# Patient Record
Sex: MALE | Race: WHITE | Employment: FULL TIME | URBAN - METROPOLITAN AREA
[De-identification: names, ages, dates, MRNs, and addresses within clinical notes are randomized per-mention and may not be internally consistent; named-entity substitution may affect disease eponyms.]

---

## 2021-08-10 ENCOUNTER — THERAPY VISIT (OUTPATIENT)
Dept: PHYSICAL THERAPY | Facility: CLINIC | Age: 51
End: 2021-08-10
Payer: COMMERCIAL

## 2021-08-10 DIAGNOSIS — M54.59 MECHANICAL LOW BACK PAIN: ICD-10-CM

## 2021-08-10 PROCEDURE — 97161 PT EVAL LOW COMPLEX 20 MIN: CPT | Mod: GP | Performed by: PHYSICAL THERAPIST

## 2021-08-10 PROCEDURE — 97140 MANUAL THERAPY 1/> REGIONS: CPT | Mod: GP | Performed by: PHYSICAL THERAPIST

## 2021-08-10 PROCEDURE — 97110 THERAPEUTIC EXERCISES: CPT | Mod: GP | Performed by: PHYSICAL THERAPIST

## 2021-08-10 NOTE — LETTER
[facility]  [address]  [address]  [facility] [address] [address]  [phone]    2021    Re: Pete Dial   :   1970  MRN:  [id number]   REFERRING PHYSICIAN:   MD NOLVIA Ramirez [facility]  Date of Initial Evaluation:  8/10/2021  Visits:  Rxs Used: 1  Reason for Referral:  Mechanical low back pain    EVALUATION SUMMARY    Physical Therapy Initial Evaluation  Subjective:  The history is provided by the patient.   Patient Health History  Pete Dial being seen for bilat glut/LB, SI pain .     Problem began: 2016.   Problem occurred: unknown    Pain is reported as 5/10 on pain scale.  General health as reported by patient is good.  Pertinent medical history includes: rheumatoid arthritis, history of fractures, depression, migraines/headaches, overweight, sleep disorder/apnea and smoking.   Red flags:  None as reported by patient.     Current medications:  Bone density and pain medication.    Current occupation is [employer]   Primary job tasks include:  Computer work and prolonged sitting.                Therapist Generated HPI Evaluation  Problem details: Pt describes a chronic, ache in his hips and gluts/LB for years. It doesn't seem to be related to activity or lack of exercise/his RhA. .         Type of problem:  Sacroiliac and lumbar.    This is a chronic condition.  Condition occurred with:  Degenerative joint disease.    Patient reports pain:  SI joint right, lower lumbar spine and lumbar spine right.  Pain is described as aching and is intermittent.  Pain radiates to:  No radiation, gluteals left, gluteals right, thigh right and thigh left. Pain is worse in the P.M..  Since onset symptoms are unchanged.  Associated symptoms:  Loss of motion/stiffness. Symptoms are exacerbated by sitting and standing      Re: Pete Dial   :   1970    Restrictions due to condition  include:  Working in normal job without restrictions.  Barriers include:  None as reported by patient.           Objective:  Lumbar/SI Evaluation  ROM:  AROM Lumbar: normal  Lumbar Myotomes:  not assessed  Lumbar DTR's:  not assessed  Cord Signs:  not assessed  Lumbar Dermtomes:  not assessed  Neural Tension/Mobility:  Lumbar:  Normal      Lumbar Palpation:    Tenderness present at Left:    Greater Trochanter  Tenderness present at Right: Piriformis; Iliac Crest; Gluteus Medius and Greater Trochanter    SI joint/Sacrum:      Right positive at:    Ilium Ventromedial  Sacral conclusion right:  Posterior inominate, sacral torsion, upslip and inflare          Assessment/Plan:    Patient is a 51 year old male with lumbar and sacral complaints.    Patient has the following significant findings with corresponding treatment plan.                Diagnosis 1:  lbp   Pain -  manual therapy, self management and home program  Decreased ROM/flexibility - manual therapy, therapeutic exercise and home program  Decreased joint mobility - manual therapy, therapeutic exercise and home program  Impaired muscle performance - neuro re-education and home program  Decreased function - therapeutic activities and home program    Therapy Evaluation Codes:   1) History comprised of:   Personal factors that impact the plan of care:      Coping style, Overall behavior pattern and Past/current experiences.    Comorbidity factors that impact the plan of care are:      Depression and Rheumatoid arthritis.     Medications impacting care: Anti-depressant, Steroids and RhA meds .  2) Examination of Body Systems comprised of:   Body structures and functions that impact the plan of care:      Hip, Lumbar spine and Sacral illiac joint.   Activity limitations that impact the plan of care are:      Bending, Squatting/kneeling, Stairs, Standing and Walking.  3) Clinical presentation characteristics are:   Stable/Uncomplicated.  4) Decision-Making    Low  complexity using standardized patient assessment instrument and/or measureable assessment of functional outcome.  Cumulative Therapy Evaluation is: Low complexity.    Previous and current functional limitations:  (See Goal Flow Sheet for this information)    Short term and Long term goals: (See Goal Flow Sheet for this information)   Re: Pete KHUSHBOO Dial   :   1970    Communication ability:  Patient appears to be able to clearly communicate and understand verbal and written communication and follow directions correctly.  Treatment Explanation - The following has been discussed with the patient:   RX ordered/plan of care  Anticipated outcomes  Possible risks and side effects  This patient would benefit from PT intervention to resume normal activities.   Rehab potential is good.    Frequency:  1 X week, once daily every other wk   Duration:  for 6 weeks  Discharge Plan:  Achieve all LTG.  Independent in home treatment program.  Reach maximal therapeutic benefit.    Please refer to the daily flowsheet for treatment today, total treatment time and time spent performing 1:1 timed codes.         Thank you for your referral.    INQUIRIES  Therapist: Noe Vazquez PT   [address] [address]  Phone: [phone]  Fax: [phone]

## 2021-08-10 NOTE — PROGRESS NOTES
Physical Therapy Initial Evaluation  Subjective:  The history is provided by the patient.   Patient Health History  Pete Dial being seen for bilat glut/LB, SI pain .     Problem began: 1/1/2016.   Problem occurred: unknown    Pain is reported as 5/10 on pain scale.  General health as reported by patient is good.  Pertinent medical history includes: rheumatoid arthritis, history of fractures, depression, migraines/headaches, overweight, sleep disorder/apnea and smoking.   Red flags:  None as reported by patient.         Current medications:  Bone density and pain medication.    Current occupation is [employer]   Primary job tasks include:  Computer work and prolonged sitting.                  Therapist Generated HPI Evaluation  Problem details: Pt describes a chronic, ache in his hips and gluts/LB for years. It doesn't seem to be related to activity or lack of exercise/his RhA. .         Type of problem:  Sacroiliac and lumbar.    This is a chronic condition.  Condition occurred with:  Degenerative joint disease.    Patient reports pain:  SI joint right, lower lumbar spine and lumbar spine right.  Pain is described as aching and is intermittent.  Pain radiates to:  No radiation, gluteals left, gluteals right, thigh right and thigh left. Pain is worse in the P.M..  Since onset symptoms are unchanged.  Associated symptoms:  Loss of motion/stiffness. Symptoms are exacerbated by sitting and standing        Restrictions due to condition include:  Working in normal job without restrictions.  Barriers include:  None as reported by patient.                        Objective:  System         Lumbar/SI Evaluation  ROM:  AROM Lumbar: normal      Lumbar Myotomes:  not assessed            Lumbar DTR's:  not assessed      Cord Signs:  not assessed    Lumbar Dermtomes:  not assessed                Neural Tension/Mobility:  Lumbar:  Normal        Lumbar Palpation:    Tenderness present at Left:    Greater  Trochanter  Tenderness present at Right: Piriformis; Iliac Crest; Gluteus Medius and Greater Trochanter        SI joint/Sacrum:          Right positive at:    Ilium Ventromedial    Sacral conclusion right:  Posterior inominate, sacral torsion, upslip and inflare                                             General     ROS    Assessment/Plan:    Patient is a 51 year old male with lumbar and sacral complaints.    Patient has the following significant findings with corresponding treatment plan.                Diagnosis 1:  lbp   Pain -  manual therapy, self management and home program  Decreased ROM/flexibility - manual therapy, therapeutic exercise and home program  Decreased joint mobility - manual therapy, therapeutic exercise and home program  Impaired muscle performance - neuro re-education and home program  Decreased function - therapeutic activities and home program    Therapy Evaluation Codes:   1) History comprised of:   Personal factors that impact the plan of care:      Coping style, Overall behavior pattern and Past/current experiences.    Comorbidity factors that impact the plan of care are:      Depression and Rheumatoid arthritis.     Medications impacting care: Anti-depressant, Steroids and RhA meds .  2) Examination of Body Systems comprised of:   Body structures and functions that impact the plan of care:      Hip, Lumbar spine and Sacral illiac joint.   Activity limitations that impact the plan of care are:      Bending, Squatting/kneeling, Stairs, Standing and Walking.  3) Clinical presentation characteristics are:   Stable/Uncomplicated.  4) Decision-Making    Low complexity using standardized patient assessment instrument and/or measureable assessment of functional outcome.  Cumulative Therapy Evaluation is: Low complexity.    Previous and current functional limitations:  (See Goal Flow Sheet for this information)    Short term and Long term goals: (See Goal Flow Sheet for this information)      Communication ability:  Patient appears to be able to clearly communicate and understand verbal and written communication and follow directions correctly.  Treatment Explanation - The following has been discussed with the patient:   RX ordered/plan of care  Anticipated outcomes  Possible risks and side effects  This patient would benefit from PT intervention to resume normal activities.   Rehab potential is good.    Frequency:  1 X week, once daily every other wk   Duration:  for 6 weeks  Discharge Plan:  Achieve all LTG.  Independent in home treatment program.  Reach maximal therapeutic benefit.    Please refer to the daily flowsheet for treatment today, total treatment time and time spent performing 1:1 timed codes.

## 2021-11-14 ENCOUNTER — HEALTH MAINTENANCE LETTER (OUTPATIENT)
Age: 51
End: 2021-11-14

## 2021-12-08 ENCOUNTER — MEDICAL CORRESPONDENCE (OUTPATIENT)
Dept: HEALTH INFORMATION MANAGEMENT | Facility: CLINIC | Age: 51
End: 2021-12-08
Payer: COMMERCIAL

## 2022-12-25 ENCOUNTER — HEALTH MAINTENANCE LETTER (OUTPATIENT)
Age: 52
End: 2022-12-25

## 2024-02-04 ENCOUNTER — HEALTH MAINTENANCE LETTER (OUTPATIENT)
Age: 54
End: 2024-02-04